# Patient Record
Sex: MALE | Race: BLACK OR AFRICAN AMERICAN | Employment: FULL TIME | URBAN - METROPOLITAN AREA
[De-identification: names, ages, dates, MRNs, and addresses within clinical notes are randomized per-mention and may not be internally consistent; named-entity substitution may affect disease eponyms.]

---

## 2020-03-21 ENCOUNTER — HOSPITAL ENCOUNTER (OUTPATIENT)
Age: 25
Discharge: HOME | End: 2020-03-21
Attending: FAMILY MEDICINE
Payer: MEDICAID

## 2020-03-21 VITALS
RESPIRATION RATE: 16 BRPM | WEIGHT: 167 LBS | BODY MASS INDEX: 26.21 KG/M2 | HEIGHT: 67 IN | SYSTOLIC BLOOD PRESSURE: 144 MMHG | DIASTOLIC BLOOD PRESSURE: 72 MMHG | TEMPERATURE: 98 F | HEART RATE: 62 BPM | OXYGEN SATURATION: 100 %

## 2020-03-21 DIAGNOSIS — N34.2 URETHRITIS: Primary | ICD-10-CM

## 2020-03-21 PROCEDURE — 87591 N.GONORRHOEAE DNA AMP PROB: CPT | Performed by: FAMILY MEDICINE

## 2020-03-21 PROCEDURE — 99204 OFFICE O/P NEW MOD 45 MIN: CPT

## 2020-03-21 PROCEDURE — 87491 CHLMYD TRACH DNA AMP PROBE: CPT | Performed by: FAMILY MEDICINE

## 2020-03-21 PROCEDURE — 96372 THER/PROPH/DIAG INJ SC/IM: CPT

## 2020-03-21 RX ORDER — AZITHROMYCIN 250 MG/1
1000 TABLET, FILM COATED ORAL ONCE
Status: COMPLETED | OUTPATIENT
Start: 2020-03-21 | End: 2020-03-21

## 2020-03-21 NOTE — ED PROVIDER NOTES
Patient Seen in: [address]      History   Patient presents with:  Dawson-LUCERO    Stated Complaint: male problem    HPI    29yo M presents to IC with 2 days of irritation and urethral discharge.  No dysuria, urgency, frequency patient  Neurological:      General: No focal deficit present. Mental Status: He is alert and oriented to person, place, and time.       Gait: Gait normal.           ED Course     Labs Reviewed   CHLAMYDIA/GONOCOCCUS, VLAD           MDM     Treated with

## 2020-03-21 NOTE — ED INITIAL ASSESSMENT (HPI)
Patient reports he would like to have a STD testing. Patient reports he has been having irritation for about 2 days, and has a history of unprotected sex.

## 2020-03-22 LAB
C TRACH DNA SPEC QL NAA+PROBE: POSITIVE
N GONORRHOEA DNA SPEC QL NAA+PROBE: NEGATIVE

## 2020-03-23 NOTE — PROGRESS NOTES
Please notify patient of positive result. He was appropriately treated in [facility]. Pt needs to notify all recent sexual partners, should not have sexual contact with any partners until they are also fully treated.